# Patient Record
Sex: MALE | Race: WHITE | Employment: UNEMPLOYED | URBAN - METROPOLITAN AREA
[De-identification: names, ages, dates, MRNs, and addresses within clinical notes are randomized per-mention and may not be internally consistent; named-entity substitution may affect disease eponyms.]

---
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
## 2024-05-19 ENCOUNTER — HOSPITAL ENCOUNTER (EMERGENCY)
Facility: HOSPITAL | Age: 19
Discharge: OTHER INSTITUTION | End: 2024-05-20
Attending: EMERGENCY MEDICINE
Payer: COMMERCIAL

## 2024-05-19 DIAGNOSIS — F29 PSYCHOSIS, UNSPECIFIED PSYCHOSIS TYPE (MULTI): Primary | ICD-10-CM

## 2024-05-19 PROCEDURE — 2500000006 HC RX 250 W HCPCS SELF ADMINISTERED DRUGS (ALT 637 FOR ALL PAYERS): Performed by: PHYSICIAN ASSISTANT

## 2024-05-19 PROCEDURE — 99285 EMERGENCY DEPT VISIT HI MDM: CPT

## 2024-05-19 RX ORDER — OLANZAPINE 5 MG/1
5 TABLET ORAL ONCE
Status: COMPLETED | OUTPATIENT
Start: 2024-05-19 | End: 2024-05-19

## 2024-05-19 RX ADMIN — OLANZAPINE 5 MG: 5 TABLET, FILM COATED ORAL at 23:57

## 2024-05-19 SDOH — HEALTH STABILITY: MENTAL HEALTH: HAVE YOU EVER DONE ANYTHING, STARTED TO DO ANYTHING, OR PREPARED TO DO ANYTHING TO END YOUR LIFE?: NO

## 2024-05-19 SDOH — HEALTH STABILITY: MENTAL HEALTH: HAVE YOU ACTUALLY HAD ANY THOUGHTS OF KILLING YOURSELF?: NO

## 2024-05-19 SDOH — HEALTH STABILITY: MENTAL HEALTH: DELUSIONS: PARANOID

## 2024-05-19 SDOH — HEALTH STABILITY: MENTAL HEALTH: HALLUCINATION: AUDITORY;VISUAL

## 2024-05-19 SDOH — HEALTH STABILITY: MENTAL HEALTH: NEEDS EXPRESSED: PHYSICAL

## 2024-05-19 SDOH — HEALTH STABILITY: MENTAL HEALTH: CONTENT: DELUSIONS;PHOBIAS

## 2024-05-19 SDOH — SOCIAL STABILITY: SOCIAL NETWORK: EMOTIONAL SUPPORT GIVEN: REASSURE

## 2024-05-19 SDOH — HEALTH STABILITY: MENTAL HEALTH: BEHAVIORS/MOOD: COOPERATIVE;DELUSIONS;HALLUCINATIONS

## 2024-05-19 SDOH — HEALTH STABILITY: MENTAL HEALTH: SUICIDE ASSESSMENT: ADULT (C-SSRS)

## 2024-05-19 SDOH — HEALTH STABILITY: MENTAL HEALTH: SLEEP PATTERN: DISTURBED/INTERRUPTED SLEEP

## 2024-05-19 SDOH — HEALTH STABILITY: MENTAL HEALTH: HAVE YOU WISHED YOU WERE DEAD OR WISHED YOU COULD GO TO SLEEP AND NOT WAKE UP?: NO

## 2024-05-19 ASSESSMENT — COLUMBIA-SUICIDE SEVERITY RATING SCALE - C-SSRS
1. IN THE PAST MONTH, HAVE YOU WISHED YOU WERE DEAD OR WISHED YOU COULD GO TO SLEEP AND NOT WAKE UP?: NO
6. HAVE YOU EVER DONE ANYTHING, STARTED TO DO ANYTHING, OR PREPARED TO DO ANYTHING TO END YOUR LIFE?: NO
2. HAVE YOU ACTUALLY HAD ANY THOUGHTS OF KILLING YOURSELF?: NO

## 2024-05-19 ASSESSMENT — PAIN DESCRIPTION - PROGRESSION: CLINICAL_PROGRESSION: NOT CHANGED

## 2024-05-19 ASSESSMENT — PAIN SCALES - GENERAL: PAINLEVEL_OUTOF10: 0 - NO PAIN

## 2024-05-19 ASSESSMENT — PAIN - FUNCTIONAL ASSESSMENT: PAIN_FUNCTIONAL_ASSESSMENT: 0-10

## 2024-05-20 ENCOUNTER — APPOINTMENT (OUTPATIENT)
Dept: CARDIOLOGY | Facility: HOSPITAL | Age: 19
End: 2024-05-20
Payer: COMMERCIAL

## 2024-05-20 VITALS
WEIGHT: 215 LBS | DIASTOLIC BLOOD PRESSURE: 74 MMHG | OXYGEN SATURATION: 99 % | HEART RATE: 80 BPM | TEMPERATURE: 98.2 F | SYSTOLIC BLOOD PRESSURE: 127 MMHG | BODY MASS INDEX: 29.12 KG/M2 | HEIGHT: 72 IN | RESPIRATION RATE: 18 BRPM

## 2024-05-20 LAB
ALBUMIN SERPL BCP-MCNC: 4.4 G/DL (ref 3.4–5)
ALP SERPL-CCNC: 61 U/L (ref 33–120)
ALT SERPL W P-5'-P-CCNC: 88 U/L (ref 10–52)
AMPHETAMINES UR QL SCN: ABNORMAL
ANION GAP SERPL CALC-SCNC: 11 MMOL/L (ref 10–20)
APAP SERPL-MCNC: <10 UG/ML
APPEARANCE UR: ABNORMAL
AST SERPL W P-5'-P-CCNC: 101 U/L (ref 9–39)
BACTERIA #/AREA URNS AUTO: ABNORMAL /HPF
BARBITURATES UR QL SCN: ABNORMAL
BASOPHILS # BLD AUTO: 0.08 X10*3/UL (ref 0–0.1)
BASOPHILS NFR BLD AUTO: 0.7 %
BENZODIAZ UR QL SCN: ABNORMAL
BILIRUB SERPL-MCNC: 0.8 MG/DL (ref 0–1.2)
BILIRUB UR STRIP.AUTO-MCNC: NEGATIVE MG/DL
BUN SERPL-MCNC: 5 MG/DL (ref 6–23)
BZE UR QL SCN: ABNORMAL
CALCIUM SERPL-MCNC: 9 MG/DL (ref 8.6–10.3)
CANNABINOIDS UR QL SCN: ABNORMAL
CHLORIDE SERPL-SCNC: 106 MMOL/L (ref 98–107)
CK SERPL-CCNC: 325 U/L (ref 0–325)
CO2 SERPL-SCNC: 26 MMOL/L (ref 21–32)
COLOR UR: YELLOW
CREAT SERPL-MCNC: 0.88 MG/DL (ref 0.5–1.3)
EGFRCR SERPLBLD CKD-EPI 2021: >90 ML/MIN/1.73M*2
EOSINOPHIL # BLD AUTO: 0.41 X10*3/UL (ref 0–0.7)
EOSINOPHIL NFR BLD AUTO: 3.5 %
ERYTHROCYTE [DISTWIDTH] IN BLOOD BY AUTOMATED COUNT: 12.9 % (ref 11.5–14.5)
ETHANOL SERPL-MCNC: <10 MG/DL
FENTANYL+NORFENTANYL UR QL SCN: ABNORMAL
GLUCOSE SERPL-MCNC: 92 MG/DL (ref 74–99)
GLUCOSE UR STRIP.AUTO-MCNC: NEGATIVE MG/DL
HCT VFR BLD AUTO: 46.6 % (ref 41–52)
HGB BLD-MCNC: 16.4 G/DL (ref 13.5–17.5)
HOLD SPECIMEN: NORMAL
HYALINE CASTS #/AREA URNS AUTO: ABNORMAL /LPF
IMM GRANULOCYTES # BLD AUTO: 0.04 X10*3/UL (ref 0–0.7)
IMM GRANULOCYTES NFR BLD AUTO: 0.3 % (ref 0–0.9)
KETONES UR STRIP.AUTO-MCNC: ABNORMAL MG/DL
LEUKOCYTE ESTERASE UR QL STRIP.AUTO: NEGATIVE
LYMPHOCYTES # BLD AUTO: 3.12 X10*3/UL (ref 1.2–4.8)
LYMPHOCYTES NFR BLD AUTO: 26.8 %
MCH RBC QN AUTO: 30 PG (ref 26–34)
MCHC RBC AUTO-ENTMCNC: 35.2 G/DL (ref 32–36)
MCV RBC AUTO: 85 FL (ref 80–100)
METHADONE UR QL SCN: ABNORMAL
MONOCYTES # BLD AUTO: 0.86 X10*3/UL (ref 0.1–1)
MONOCYTES NFR BLD AUTO: 7.4 %
MUCOUS THREADS #/AREA URNS AUTO: ABNORMAL /LPF
NEUTROPHILS # BLD AUTO: 7.13 X10*3/UL (ref 1.2–7.7)
NEUTROPHILS NFR BLD AUTO: 61.3 %
NITRITE UR QL STRIP.AUTO: NEGATIVE
NRBC BLD-RTO: 0 /100 WBCS (ref 0–0)
OPIATES UR QL SCN: ABNORMAL
OXYCODONE+OXYMORPHONE UR QL SCN: ABNORMAL
PCP UR QL SCN: ABNORMAL
PH UR STRIP.AUTO: 6 [PH]
PLATELET # BLD AUTO: 278 X10*3/UL (ref 150–450)
POTASSIUM SERPL-SCNC: 3.7 MMOL/L (ref 3.5–5.3)
PROT SERPL-MCNC: 7 G/DL (ref 6.4–8.2)
PROT UR STRIP.AUTO-MCNC: ABNORMAL MG/DL
RBC # BLD AUTO: 5.47 X10*6/UL (ref 4.5–5.9)
RBC # UR STRIP.AUTO: NEGATIVE /UL
RBC #/AREA URNS AUTO: ABNORMAL /HPF
SALICYLATES SERPL-MCNC: <3 MG/DL
SARS-COV-2 RNA RESP QL NAA+PROBE: NOT DETECTED
SODIUM SERPL-SCNC: 139 MMOL/L (ref 136–145)
SP GR UR STRIP.AUTO: 1.01
UROBILINOGEN UR STRIP.AUTO-MCNC: <2 MG/DL
WBC # BLD AUTO: 11.6 X10*3/UL (ref 4.4–11.3)
WBC #/AREA URNS AUTO: ABNORMAL /HPF

## 2024-05-20 PROCEDURE — 80143 DRUG ASSAY ACETAMINOPHEN: CPT | Performed by: PHYSICIAN ASSISTANT

## 2024-05-20 PROCEDURE — 85025 COMPLETE CBC W/AUTO DIFF WBC: CPT | Performed by: PHYSICIAN ASSISTANT

## 2024-05-20 PROCEDURE — 82550 ASSAY OF CK (CPK): CPT | Performed by: PHYSICIAN ASSISTANT

## 2024-05-20 PROCEDURE — 93005 ELECTROCARDIOGRAM TRACING: CPT

## 2024-05-20 PROCEDURE — 87086 URINE CULTURE/COLONY COUNT: CPT | Mod: ELYLAB | Performed by: PHYSICIAN ASSISTANT

## 2024-05-20 PROCEDURE — 81001 URINALYSIS AUTO W/SCOPE: CPT | Mod: 59 | Performed by: PHYSICIAN ASSISTANT

## 2024-05-20 PROCEDURE — 80053 COMPREHEN METABOLIC PANEL: CPT | Performed by: PHYSICIAN ASSISTANT

## 2024-05-20 PROCEDURE — 87635 SARS-COV-2 COVID-19 AMP PRB: CPT | Performed by: EMERGENCY MEDICINE

## 2024-05-20 PROCEDURE — 36415 COLL VENOUS BLD VENIPUNCTURE: CPT | Performed by: PHYSICIAN ASSISTANT

## 2024-05-20 PROCEDURE — 80307 DRUG TEST PRSMV CHEM ANLYZR: CPT | Performed by: PHYSICIAN ASSISTANT

## 2024-05-20 SDOH — HEALTH STABILITY: MENTAL HEALTH: NEEDS EXPRESSED: DENIES

## 2024-05-20 SDOH — SOCIAL STABILITY: SOCIAL NETWORK: PARENT/GUARDIAN/SIGNIFICANT OTHER INVOLVEMENT: NO INVOLVEMENT

## 2024-05-20 SDOH — SOCIAL STABILITY: SOCIAL NETWORK: VISITOR BEHAVIORS: UNABLE TO ASSESS

## 2024-05-20 SDOH — SOCIAL STABILITY: SOCIAL INSECURITY: FAMILY BEHAVIORS: UNABLE TO ASSESS

## 2024-05-20 SDOH — HEALTH STABILITY: MENTAL HEALTH: BEHAVIORS/MOOD: SLEEPING

## 2024-05-20 SDOH — HEALTH STABILITY: MENTAL HEALTH: BEHAVIORS/MOOD: RESTLESS

## 2024-05-20 SDOH — HEALTH STABILITY: MENTAL HEALTH: DEPRESSION SYMPTOMS: NO PROBLEMS REPORTED OR OBSERVED.

## 2024-05-20 SDOH — SOCIAL STABILITY: SOCIAL NETWORK: EMOTIONAL SUPPORT GIVEN: REASSURE

## 2024-05-20 SDOH — HEALTH STABILITY: MENTAL HEALTH: ANXIETY SYMPTOMS: NO PROBLEMS REPORTED OR OBSERVED.

## 2024-05-20 SDOH — ECONOMIC STABILITY: HOUSING INSECURITY: FEELS SAFE LIVING IN HOME: YES

## 2024-05-20 SDOH — HEALTH STABILITY: MENTAL HEALTH: SUICIDAL BEHAVIOR (LIFETIME): NO

## 2024-05-20 SDOH — HEALTH STABILITY: MENTAL HEALTH: BEHAVIORS/MOOD: CALM;COOPERATIVE

## 2024-05-20 SDOH — SOCIAL STABILITY: SOCIAL NETWORK: VISITOR BEHAVIORS: CALM;COOPERATIVE;SUPPORTIVE

## 2024-05-20 SDOH — HEALTH STABILITY: MENTAL HEALTH: BEHAVIORS/MOOD: COOPERATIVE;DELUSIONS;HALLUCINATIONS

## 2024-05-20 SDOH — HEALTH STABILITY: MENTAL HEALTH: WISH TO BE DEAD (PAST 1 MONTH): NO

## 2024-05-20 SDOH — SOCIAL STABILITY: SOCIAL INSECURITY: FAMILY BEHAVIORS: CALM;COOPERATIVE

## 2024-05-20 SDOH — HEALTH STABILITY: MENTAL HEALTH: BEHAVIORS/MOOD: COOPERATIVE;HALLUCINATIONS

## 2024-05-20 SDOH — HEALTH STABILITY: MENTAL HEALTH: NON-SPECIFIC ACTIVE SUICIDAL THOUGHTS (PAST 1 MONTH): NO

## 2024-05-20 SDOH — SOCIAL STABILITY: SOCIAL INSECURITY: FAMILY BEHAVIORS: CALM;COOPERATIVE;SUPPORTIVE

## 2024-05-20 ASSESSMENT — LIFESTYLE VARIABLES
SUBSTANCE_ABUSE_PAST_12_MONTHS: NO
PRESCIPTION_ABUSE_PAST_12_MONTHS: NO

## 2024-05-20 NOTE — ED PROVIDER NOTES
HPI   Chief Complaint   Patient presents with    Psychiatric Evaluation     PT PINK SLIPPED BY HAMIDA. PT TALKING TO SELF IN ROOM, CALM AND REDIRECTABLE AT THIS TIME. PT STATES HIS MOM CALLED THE HOSPITAL CAUSE SHE WAS DRUNK       18-year-old male brought in from the work with the pink slip with report of disorganized thoughts with acute psychosis and delusional.  According to the pink slip written by the [facility] health officer patient was exhibiting signs of being internally stimulated, posturing and intubated fashion, disorganized thought unable to focus and concentrate, unable to complete ADLs, exhibiting appreciable impulsive behavior family is fearful for their wellbeing as well as with the patient's, patient is showing signs of delusions.  The patient has been hospitalized in the past for acute psychosis.  Patient denies being suicidal homicidal however he is unable to carry on a conversation and continues to exhibit talking to himself without answering questions.  He does have a history of psychosis and cannabis use disorder.      History provided by:  Patient and medical records  History limited by:  Psychiatric disorder                      Rigoberto Coma Scale Score: 15                     Patient History   History reviewed. No pertinent past medical history.  Past Surgical History:   Procedure Laterality Date    OTHER SURGICAL HISTORY  07/14/2020    No history of surgery     No family history on file.  Social History     Tobacco Use    Smoking status: Never    Smokeless tobacco: Never   Substance Use Topics    Alcohol use: Never    Drug use: Never       Physical Exam   ED Triage Vitals [05/19/24 2331]   Temperature Heart Rate Respirations BP   36.8 °C (98.2 °F) (!) 14 18 (!) 171/99      Pulse Ox Temp Source Heart Rate Source Patient Position   99 % Temporal Monitor Sitting      BP Location FiO2 (%)     Right arm --       Physical Exam  Vitals and nursing note reviewed. Exam conducted with a chaperone  present.   Constitutional:       General: He is awake. He is not in acute distress.     Appearance: Normal appearance. He is overweight. He is not ill-appearing, toxic-appearing or diaphoretic.   HENT:      Head: Normocephalic and atraumatic.      Right Ear: Tympanic membrane, ear canal and external ear normal.      Left Ear: Tympanic membrane, ear canal and external ear normal.      Nose: Nose normal.      Mouth/Throat:      Mouth: Mucous membranes are moist.      Pharynx: Oropharynx is clear.   Eyes:      Extraocular Movements: Extraocular movements intact.      Conjunctiva/sclera: Conjunctivae normal.      Pupils: Pupils are equal, round, and reactive to light.   Cardiovascular:      Rate and Rhythm: Normal rate and regular rhythm.      Pulses: Normal pulses.      Heart sounds: Normal heart sounds.   Pulmonary:      Effort: Pulmonary effort is normal.      Breath sounds: Normal breath sounds. No wheezing, rhonchi or rales.   Abdominal:      General: Abdomen is flat. Bowel sounds are normal.      Palpations: Abdomen is soft. There is no mass.      Tenderness: There is no abdominal tenderness. There is no guarding.   Musculoskeletal:         General: No swelling or tenderness. Normal range of motion.      Cervical back: Normal range of motion and neck supple.   Skin:     General: Skin is warm and dry.      Capillary Refill: Capillary refill takes less than 2 seconds.      Findings: No rash.   Neurological:      General: No focal deficit present.      Mental Status: He is alert and oriented to person, place, and time. Mental status is at baseline.      GCS: GCS eye subscore is 4. GCS verbal subscore is 5. GCS motor subscore is 6.      Cranial Nerves: Cranial nerves 2-12 are intact.      Sensory: Sensation is intact.      Motor: Motor function is intact.      Coordination: Coordination is intact.   Psychiatric:         Attention and Perception: He is inattentive.         Mood and Affect: Mood normal. Affect is flat.          Speech: Speech is delayed.         Behavior: Behavior is slowed. Behavior is cooperative.         Thought Content: Thought content is paranoid and delusional.         Cognition and Memory: Memory is impaired. He exhibits impaired recent memory.         Judgment: Judgment is impulsive.         ED Course & MDM   Diagnoses as of 05/20/24 0438   Psychosis, unspecified psychosis type (Multi)       Medical Decision Making  Temperature 36 8 blood pressure 171/99, respirations 18 pulse ox is 99% on room air patient was given Zyprexa 5 mg p.o. and have ordered psychiatric evaluation.  EKG interpreted by me at 0010 hrs. EKG reads atrial fibrillation however do not see any evidence of A-fib ventricular rate was 78 QRS was 98  QTc was 417.  We will repeat the EKG    CBC white count 11.6 hemoglobin 16.4 hematocrit 46.6 platelet count was 278 metabolic shows a BUN of 5 AST of 101 ALT of 88, acute toxicology panel was negative CK3 25 COVID-negative.  Patient was evaluated by EPAT and they recommend placement.  Patient's care turned over to Abi Zazueta NP pending placement by EPAT        Procedure  Procedures     Miguel Formean PA-C  05/20/24 0438

## 2024-05-20 NOTE — SIGNIFICANT EVENT
Application for Emergency Admission      Ready for Transfer?  Is the patient medically cleared for transfer to inpatient psychiatry: Yes  Has the patient been accepted to an inpatient psychiatric hospital: Yes    Application for Emergency Admission  IN ACCORDANCE WITH [place] 5122[place]10 [place]  The Chief Clinical Officer of: [place] 5/20/2024 .1:40 PM    Reason for Hospitalization  The undersigned has reason to believe that: Jonnie Carballo Is a mentally ill person subject to hospitalization by court order under division B Section 5122.01 of the Revised Code, i.e., this person:    1.No  Represents a substantial risk of physical harm to self as manifested by evidence of threats of, or attempts at, suicide or serious self-inflicted bodily harm    2.No Represents a substantial risk of physical harm to others as manifested by evidence of recent homicidal or other violent behavior, evidence of recent threats that place another in reasonable fear of violent behavior and serious physical harm, or other evidence of present dangerousness    3.Yes Represents a substantial and immediate risk of serious physical impairment or injury to self as manifested by  evidence that the person is unable to provide for and is not providing for the person's basic physical needs because of the person's mental illness and that appropriate provision for those needs cannot be made  immediately available in the community    4.Yes Would benefit from treatment in a hospital for his mental illness and is in need of such treatment as manifested by evidence of behavior that creates a grave and imminent risk to substantial rights of others or  himself.    5.No Would benefit from treatment as manifested by evidence of behavior that indicates all of the following:       (a) The person is unlikely to survive safely in the community without supervision, based on a clinical determination.       (b) The person has a history of lack of  compliance with treatment for mental illness and one of the following applies:      (i) At least twice within the thirty-six months prior to the filing of an affidavit seeking court-ordered treatment of the person under section 5122.111 of the Revised Code, the lack of compliance has been a significant factor in necessitating hospitalization in a hospital or receipt of services in a forensic or other mental health unit of a correctional facility, provided that the thirty-six-month period shall be extended by the length of any hospitalization or incarceration of the person that occurred within the thirty-six-month period.      (ii) Within the forty-eight months prior to the filing of an affidavit seeking court-ordered treatment of the person under section 5122.111 of the Revised Code, the lack of compliance resulted in one or more acts of serious violent behavior toward self or others or threats of, or attempts at, serious physical harm to self or others, provided that the forty-eight-month period shall be extended by the length of any hospitalization or incarceration of the person that occurred within the forty-eight-month period.      (c) The person, as a result of mental illness, is unlikely to voluntarily participate in necessary treatment.       (d) In view of the person's treatment history and current behavior, the person is in need of treatment in order to prevent a relapse or deterioration that would be likely to result in substantial risk of serious harm to the person or others.    (e) Represents a substantial risk of physical harm to self or others if allowed to remain at liberty pending examination.    Therefore, it is requested that said person be admitted to the above named facility.    STATEMENT OF BELIEF    Must be filled out by one of the following: a psychiatrist, licensed physician, licensed clinical psychologist, health or ,  or .  (Statement shall include the  circumstances under which the individual was taken into custody and the reason for the person's belief that hospitalization is necessary. The statement shall also include a reference to efforts made to secure the individual's property at his residence if he was taken into custody there. Every reasonable and appropriate effort should be made to take this person into custody in the least conspicuous manner possible.)    Patient presents after being evaluated by [facility] with pink slip due to psychotic behavior, increasingly threatening toward family.  Patient's found to be internally stimulated, irritable, disoriented, confused, paranoid.  Would benefit from inpatient psychiatric hospitalization for stabilization    Abi Zazueta, IRINEO-MERARI 5/20/2024   Dr DEE Murray  _____________________________________________________________   Place of Employment: [facility]    STATEMENT OF OBSERVATION BY PSYCHIATRIST, LICENSED PHYSICIAN, OR LICENSED CLINICAL PSYCHOLOGIST, IF APPLICABLE    Place of Observation (e.g., [facility] center, general hospital, office, emergency facility)  (If applicable, please complete)    IRINEO Beck-MERARI 5/20/2024    _____________________________________________________________

## 2024-05-20 NOTE — PROGRESS NOTES
Emergency Medicine Transition of Care Note.    I received Jonnie Carballo in signout from DAISHA Solis.  Please see the previous ED provider note for all HPI, PE and MDM up to the time of signout at 0 600. This is in addition to the primary record.    In brief Jonnie Carballo is an 18 y.o. male presenting for   Chief Complaint   Patient presents with    Psychiatric Evaluation     PT PINK SLIPPED BY HAMIDA. PT TALKING TO SELF IN ROOM, CALM AND REDIRECTABLE AT THIS TIME. PT STATES HIS MOM CALLED THE HOSPITAL CAUSE SHE WAS DRUNK     At the time of signout we were awaiting: EPAT placement    Diagnoses as of 05/20/24 1343   Psychosis, unspecified psychosis type (Multi)       Medical Decision Making      Final diagnoses:   [F29] Psychosis, unspecified psychosis type (Multi)       Patient presented overnight with concerns for psychosis.  After medical clearance was referred to EPAT, felt the patient would benefit from inpatient psychiatric hospitalization for stabilization.    Patient's remained calm throughout his ED stay, no interventions necessary.  Ultimately referred to and accepted by Jimena Jay - Dr Clement.  Will be transferred.    Procedure  Procedures    Abi Zazueta, APRN-CNP

## 2024-05-20 NOTE — PROGRESS NOTES
EPAT - Social Work Psychiatric Assessment    Arrival Details  Mode of Arrival: Ambulance  Admission Source: Home  Admission Type: Involuntary  EPAT Assessment Start Date: 05/20/24  EPAT Assessment Start Time: 0030  Name of : MICHAEL Randle    History of Present Illness  Admission Reason: Psychosis    HPI: Pt, who is a 18 year old male, presents to the [place] ED with a chief complaint of psychosis. Prior to assessment, pt’s provider note, triage note, and community record were reviewed. Today, pt’s mother called [facility] to the home because of pt’s bizarre and psychotic behavior. Pt has been increasingly threatening and posturing towards family. When Lomita arrived, pt was noted to be internally stimulated, irritable, mumbling to himself, disoriented, and confused. Lomita wrote an application for emergency admission and pt was transported to the ED. In the ED, pt was guarded but generally cooperative with medical workup. “No risk” was noted on pt’s [place] risk screening tool. EPAT was consulted for further evaluation. Pt was observed mumbling and talking to himself in ED room. On assessment, pt is very guarded and denying all psychiatric symptoms despite displaying several signs of psychosis.       Pt has a past psychiatric history of psychosis and cannabis use disorder with several rule outs including depressive disorder, substance induced psychosis, or psychotic disorder secondary to general medical condition. Pt has one prior psychiatric admission to [facility] in 2022 after presenting to [facility] ED with psychosis and elevated CK. Pt was discharged on Zyprexa 15mg and Vistaril. He was referred to a [facility] program but it is unclear if he ever followed up with that service.       Pt currently resides with his mother “and 3 other people,” per pt; presumably mother’s partner and pt’s siblings, though he would not confirm. Pt reported that he grew up in [place] but his chart reflects that he lived in  [place], at least during his adolescents. Pt makes several references to his mother being a “drunk.” Chart history reflects that pt’s mother does have a history of alcoholism.     Readmission Information   Readmission within 30 Days: No    Psychiatric Symptoms  Anxiety Symptoms: No problems reported or observed.  Depression Symptoms: No problems reported or observed.  Gloria Symptoms: No problems reported or observed.    Psychosis Symptoms  Hallucination Type: Auditory (Internally preoccupied)  Delusion Type: Paranoid    Additional Symptoms - Adult  Generalized Anxiety Disorder: No problems reported or observed.  Obsessive Compulsive Disorder: No problems reported or observed.  Panic Attack: No problems reported or observed.  Post Traumatic Stress Disorder: No problems reported or observed.  Delirium: No problems reported or observed.    Past Psychiatric History/Meds/Treatments  Past Psychiatric History: One prior admission to [facility] for psychosis 2022  Past Psychiatric Meds/Treatments: Zyprexa, Vistaril. Not currently taking  Past Violence/Victimization History: None known    Current Mental Health Contacts   Name/Phone Number: None   Last Appointment Date: N/A  Provider Name/Phone Number: None  Provider Last Appointment Date: N/A    Support System: Immediate family    Living Arrangement: House    Home Safety  Feels Safe Living in Home: Yes    Income Information  Employment Status for: Patient  Employment Status: Unemployed  Income Source: Unemployed  Employment/ Finance Comments: When asked about finances, pt reported that he donates plasma    [employer] Service/Education History  Current or Previous  Service: None  Education Level: Less than high school (Dropped out in 9th or 10th grade)  History of School Behavior Problems:  (Chronic truancy issues have previously been noted)    Social/Cultural History  Social History: Pt is a 18 year old  male, history of SMI,  lives at home with family.  Cultural Requests During Hospitalization: none  Spiritual Requests During Hospitalization: None  Important Activities:  (Pt reported that he likes sports)    Legal  Legal Considerations: Patient/ Family Ability to Make Healthcare Decisions  Assistance with Managing/Advocating Healthcare Needs:  (Self)  Criminal Activity/ Legal Involvement Pertinent to Current Situation/ Hospitalization: None    Drug Screening  Have you used any substances (canabis, cocaine, heroin, hallucinogens, inhalants, etc.) in the past 12 months?: No  Have you used any prescription drugs other than prescribed in the past 12 months?: No  Is a toxicology screen needed?: Yes         Psychosocial  Behaviors/Mood: Cooperative, Guarded, Paranoid  Affect:  (Flat)    Orientation  Orientation Level: Disoriented to situation (Otherwise oriented)    General Appearance  Motor Activity: Restlessness  Speech Pattern:  (Jaden, minimal)  General Attitude: Suspicious, Guarded, Defensive  Appearance/Hygiene: Body odor, Disheveled, Poor hygiene    Thought Process  Coherency: Disorganized  Content: Delusions, Confabulation  Delusions: Paranoid  Perception: Hallucinations  Hallucination: Auditory  Judgment/Insight: Impaired  Confusion: Mild  Cognition: Poor judgement         Risk Factors  Self Harm/Suicidal Ideation Plan: Pt denied  Previous Self Harm/Suicidal Plans: Pt denied  Risk Factors: Male, Major mental illness    Violence Risk Assessment  Assessment of Violence: None noted  Thoughts of Harm to Others: No    Ability to Assess Risk Screen  Risk Screen - Ability to Assess: Able to be screened  Blount Suicide Severity Rating Scale (Screener/Recent Self-Report)  1. Wish to be Dead (Past 1 Month): No  2. Non-Specific Active Suicidal Thoughts (Past 1 Month): No  6. Suicidal Behavior (Lifetime): No  Calculated C-SSRS Risk Score (Lifetime/Recent): No Risk Indicated  Step 1: Risk Factors  Current & Past Psychiatric Dx: Psychotic  disorder  Presenting Symptoms: Psychosis  Change in Treatment: Non-compliant or not receiving treatment  Access to Lethal Methods : No  Step 2: Protective Factors   Protective Factors External: Supportive social network or family or friends  Step 5: Documentation  Risk Level: Low suicide risk (Denied current or past thoughts of suicide)    Psychiatric Impression and Plan of Care    Assessment and Plan: Pt is a 18 year old male presenting for psychiatric evaluation with a chief complaint of psychosis. Prior to assessment, pt could be heard conversing with himself. When this writer entered the room, pt began to feign sleep. Significant effort was made to “wake” pt up and participate. He eventually sat up but remained guarded and minimally cooperative. He denied all psychiatric complaints, scoffing whenever asked about a specific symptoms. He specifically denied SI/HI/AH/VH. While denying these symptoms, pt was objectively internally preoccupied; looking around the room, distracted, needing questions repeated, and repeatedly mumbling to himself. When this was pointed out to pt, he stated “I don’t know what you’re talking about” and “don’t worry about it.” Pt was suspicious of this writer and would frequently ask why he was being asked certain questions. Pt appeared mildly confused and would confabulate at times. Though speaking minimally, he appeared to have disorganized thought process. Pt was unkempt, dirty, and malodorous.         Dx: Unspecified psychosis     r/o schizophrenia     r/o substance induced psychosis (UDS not resulted but pt denied drug use)         Plan: Pt meets criteria for inpatient psychiatric hospitalization because he appears gravely disabled by his present psychotic symptoms.      Specific Resources Provided to Patient: Inpatient  CM Notified: -  PHP/IOP Recommended: -  Specific Information Provided for PHP/IOP: -  Plan Comments: -    Outcome/Disposition  Patient's Perception of Outcome  Achieved: Wants discharge  Assessment, Recommendations and Risk Level Reviewed with: Miguel Foreman PAC  Contact Name: Tanya  Contact Number(s): [phone]  Contact Relationship: Pt's mother  EPAT Assessment Completed Date: 05/20/24  EPAT Assessment Completed Time: 0204

## 2024-05-21 LAB — BACTERIA UR CULT: NO GROWTH

## 2024-05-25 LAB
ATRIAL RATE: 84 BPM
Q ONSET: 213 MS
QRS COUNT: 12 BEATS
QRS DURATION: 98 MS
QT INTERVAL: 366 MS
QTC CALCULATION(BAZETT): 417 MS
QTC FREDERICIA: 399 MS
R AXIS: 69 DEGREES
T AXIS: 25 DEGREES
T OFFSET: 396 MS
VENTRICULAR RATE: 78 BPM

## 2025-06-23 ENCOUNTER — APPOINTMENT (OUTPATIENT)
Dept: GENERAL RADIOLOGY | Age: 20
End: 2025-06-23
Payer: COMMERCIAL

## 2025-06-23 ENCOUNTER — HOSPITAL ENCOUNTER (EMERGENCY)
Age: 20
Discharge: HOME | End: 2025-06-23
Payer: COMMERCIAL

## 2025-06-23 VITALS
RESPIRATION RATE: 10 BRPM | SYSTOLIC BLOOD PRESSURE: 120 MMHG | HEIGHT: 71 IN | WEIGHT: 200 LBS | HEART RATE: 69 BPM | OXYGEN SATURATION: 96 % | BODY MASS INDEX: 28 KG/M2 | DIASTOLIC BLOOD PRESSURE: 69 MMHG | TEMPERATURE: 98.3 F

## 2025-06-23 DIAGNOSIS — F41.1 ANXIETY STATE: Primary | ICD-10-CM

## 2025-06-23 DIAGNOSIS — S69.91XA INJURY OF RIGHT HAND, INITIAL ENCOUNTER: ICD-10-CM

## 2025-06-23 PROCEDURE — 99283 EMERGENCY DEPT VISIT LOW MDM: CPT

## 2025-06-23 PROCEDURE — 6370000000 HC RX 637 (ALT 250 FOR IP)

## 2025-06-23 PROCEDURE — 73130 X-RAY EXAM OF HAND: CPT

## 2025-06-23 RX ORDER — HYDROXYZINE PAMOATE 25 MG/1
25 CAPSULE ORAL ONCE
Status: COMPLETED | OUTPATIENT
Start: 2025-06-23 | End: 2025-06-23

## 2025-06-23 RX ADMIN — HYDROXYZINE PAMOATE 25 MG: 25 CAPSULE ORAL at 14:05

## 2025-06-23 ASSESSMENT — PAIN - FUNCTIONAL ASSESSMENT: PAIN_FUNCTIONAL_ASSESSMENT: 0-10

## 2025-06-23 ASSESSMENT — LIFESTYLE VARIABLES
HOW MANY STANDARD DRINKS CONTAINING ALCOHOL DO YOU HAVE ON A TYPICAL DAY: PATIENT DOES NOT DRINK
HOW OFTEN DO YOU HAVE A DRINK CONTAINING ALCOHOL: NEVER

## 2025-06-23 ASSESSMENT — PAIN SCALES - GENERAL: PAINLEVEL_OUTOF10: 2
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
